# Patient Record
Sex: MALE | Race: ASIAN | NOT HISPANIC OR LATINO | URBAN - METROPOLITAN AREA
[De-identification: names, ages, dates, MRNs, and addresses within clinical notes are randomized per-mention and may not be internally consistent; named-entity substitution may affect disease eponyms.]

---

## 2019-08-16 ENCOUNTER — OFFICE VISIT - HEALTHEAST (OUTPATIENT)
Dept: FAMILY MEDICINE | Facility: CLINIC | Age: 29
End: 2019-08-16

## 2019-08-16 ENCOUNTER — COMMUNICATION - HEALTHEAST (OUTPATIENT)
Dept: TELEHEALTH | Facility: CLINIC | Age: 29
End: 2019-08-16

## 2019-08-16 DIAGNOSIS — Z20.6 EXPOSURE TO HUMAN IMMUNODEFICIENCY VIRUS: ICD-10-CM

## 2019-08-16 LAB
ANION GAP SERPL CALCULATED.3IONS-SCNC: 11 MMOL/L (ref 5–18)
BUN SERPL-MCNC: 15 MG/DL (ref 8–22)
CALCIUM SERPL-MCNC: 10 MG/DL (ref 8.5–10.5)
CHLORIDE BLD-SCNC: 104 MMOL/L (ref 98–107)
CO2 SERPL-SCNC: 26 MMOL/L (ref 22–31)
CREAT SERPL-MCNC: 0.98 MG/DL (ref 0.7–1.3)
GFR SERPL CREATININE-BSD FRML MDRD: >60 ML/MIN/1.73M2
GLUCOSE BLD-MCNC: 98 MG/DL (ref 70–125)
HIV 1+2 AB+HIV1 P24 AG SERPL QL IA: NEGATIVE
POTASSIUM BLD-SCNC: 4.3 MMOL/L (ref 3.5–5)
SODIUM SERPL-SCNC: 141 MMOL/L (ref 136–145)

## 2019-08-17 LAB
HBV SURFACE AG SERPL QL IA: NEGATIVE
T PALLIDUM AB SER QL: NEGATIVE

## 2019-08-19 LAB
C TRACH DNA SPEC QL PROBE+SIG AMP: NEGATIVE
N GONORRHOEA DNA SPEC QL NAA+PROBE: NEGATIVE

## 2019-09-06 ENCOUNTER — OFFICE VISIT - HEALTHEAST (OUTPATIENT)
Dept: FAMILY MEDICINE | Facility: CLINIC | Age: 29
End: 2019-09-06

## 2019-09-06 DIAGNOSIS — Z20.6 EXPOSURE TO HUMAN IMMUNODEFICIENCY VIRUS: ICD-10-CM

## 2019-09-06 DIAGNOSIS — Z00.00 ROUTINE HISTORY AND PHYSICAL EXAMINATION OF ADULT: ICD-10-CM

## 2019-09-06 DIAGNOSIS — Z11.3 SCREEN FOR STD (SEXUALLY TRANSMITTED DISEASE): ICD-10-CM

## 2019-09-06 LAB
CHOLEST SERPL-MCNC: 189 MG/DL
FASTING STATUS PATIENT QL REPORTED: YES
HDLC SERPL-MCNC: 44 MG/DL
LDLC SERPL CALC-MCNC: 116 MG/DL
TRIGL SERPL-MCNC: 144 MG/DL

## 2019-09-06 RX ORDER — EMTRICITABINE AND TENOFOVIR DISOPROXIL FUMARATE 200; 300 MG/1; MG/1
1 TABLET, FILM COATED ORAL DAILY
Qty: 30 TABLET | Refills: 0 | Status: SHIPPED | OUTPATIENT
Start: 2019-09-06

## 2019-09-09 ENCOUNTER — COMMUNICATION - HEALTHEAST (OUTPATIENT)
Dept: INTERNAL MEDICINE | Facility: CLINIC | Age: 29
End: 2019-09-09

## 2019-09-09 LAB — HEPATITIS B SURFACE ANTIBODY LHE- HISTORICAL: POSITIVE

## 2019-09-27 ENCOUNTER — OFFICE VISIT - HEALTHEAST (OUTPATIENT)
Dept: FAMILY MEDICINE | Facility: CLINIC | Age: 29
End: 2019-09-27

## 2019-09-27 DIAGNOSIS — B35.6 TINEA CRURIS: ICD-10-CM

## 2019-09-27 DIAGNOSIS — R21 GROIN RASH: ICD-10-CM

## 2019-09-28 ENCOUNTER — COMMUNICATION - HEALTHEAST (OUTPATIENT)
Dept: SCHEDULING | Facility: CLINIC | Age: 29
End: 2019-09-28

## 2019-09-28 LAB
HSV SPECIMEN: NORMAL
HSV1 DNA SPEC QL NAA+PROBE: NEGATIVE
HSV2 DNA SPEC QL NAA+PROBE: NEGATIVE

## 2019-09-30 ENCOUNTER — OFFICE VISIT - HEALTHEAST (OUTPATIENT)
Dept: FAMILY MEDICINE | Facility: CLINIC | Age: 29
End: 2019-09-30

## 2019-09-30 DIAGNOSIS — R21 GROIN RASH: ICD-10-CM

## 2021-05-31 NOTE — PROGRESS NOTES
Impression:  Possible HIV exposure, interested in starting on preexposure HIV prophylaxis    Plan:  Check HIV, RPR, GC, chlamydia, hepatitis B surface antigen, metabolic profile.  Defer to internal medicine to follow-up on results of testing and to start on preexposure prophylaxis      Chief Complaint:  Chief Complaint   Patient presents with     HIV Positive/AIDS     patient interested in HIV testing today- partner exposed to HIV          HPI:   Wero Pal is a 28 y.o. male who presents to this clinic for the evaluation of HIV testing.  The patient's male sexual partner was recently diagnosed with HIV and he requests testing.  The patient was last tested 7 years ago and was negative.  He has had no symptoms of fever chills body aches adenopathy rash.  He has not had sex with his partner for many months.  He is interested in starting on preexposure HIV prophylaxis for the future.      PMH:   No past medical history on file.  No past surgical history on file.      ROS:  All other systems negative    Meds:  No current outpatient medications on file.        Social:  Social History     Socioeconomic History     Marital status: Single     Spouse name: Not on file     Number of children: Not on file     Years of education: Not on file     Highest education level: Not on file   Occupational History     Not on file   Social Needs     Financial resource strain: Not on file     Food insecurity:     Worry: Not on file     Inability: Not on file     Transportation needs:     Medical: Not on file     Non-medical: Not on file   Tobacco Use     Smoking status: Never Smoker     Smokeless tobacco: Never Used   Substance and Sexual Activity     Alcohol use: Not on file     Drug use: Not on file     Sexual activity: Not on file   Lifestyle     Physical activity:     Days per week: Not on file     Minutes per session: Not on file     Stress: Not on file   Relationships     Social connections:     Talks on phone: Not on file     Gets  together: Not on file     Attends Congregation service: Not on file     Active member of club or organization: Not on file     Attends meetings of clubs or organizations: Not on file     Relationship status: Not on file     Intimate partner violence:     Fear of current or ex partner: Not on file     Emotionally abused: Not on file     Physically abused: Not on file     Forced sexual activity: Not on file   Other Topics Concern     Not on file   Social History Narrative     Not on file         Physical Exam:  Vital signs reviewed  Eyes: PERRL, EOMI  Head: Atraumatic and normocephalic  Skin: No lesions or rash  Neuro: Normal motor and sensory function in all extremities  Psych: Awake, alert, normally responsive      Results:    No results found for this or any previous visit (from the past 24 hour(s)).    No results found.      Dima Olivia MD

## 2021-06-01 NOTE — PROGRESS NOTES
MALE PREVENTATIVE EXAM    Assessment and Plan:       1. Screen for STD (sexually transmitted disease)  - Hepatitis B Surface Antibody (Anti-HBs)  Positive with immunity    2. Routine history and physical examination of adult  - Lipid Profile  Normal    3. Exposure to human immunodeficiency virus  Infected partner recently diagnosed with HIV whom he had relationship with a year ago.   - initial labs were negative as tested in the [facility] on 8/16/19.   - recommending Recheck HIV in a month, 3 months, and 6 months interval.     He expressed interest in taking Truvada.     Discussed confirming immunity to Hep B, and Anti-HBs tested negative     Plan:   - emtricitabine-tenofovir, TDF, (TRUVADA) 200-300 mg per tablet; Take 1 tablet by mouth daily.  Dispense: 30 tablet; Refill: 0     Next follow up:  Return in about 1 month (around 10/6/2019) for Follow up in Primary Care.  Recheck HIV in a month, 3 months, and 6 months interval.       Immunization Review  Adult Imm Review: No immunizations due today    I discussed the following with the patient:   Adult Healthy Living: Importance of regular exercise  Healthy nutrition, and safe sex practices.     An additional 25  minutes spent on this visit with more than 50% time spent on   reviewing medical record, education and discussion of the above problem, and commenting the result notes.         Subjective:   Chief Complaint: Wero Pal is an 28 y.o. male here for a preventative health visit.       He is also here for healthcare establishment and in follow up to HIV exposure. The patient's male sexual partner whom been having a relationship with a year ago, was recently diagnosed with HIV.   He was checked for  HIV, RPR, GC, chlamydia, hepatitis B surface antigen, metabolic profile on 8/16/19, with negative results and treatment deferred to primary care.   His Prior STD check ups have all been negative, last tested 7 years ago.  He has no other presenting symptoms, and inquiring  about  preexposure prophylaxis.       Healthy Habits  Are you taking a daily aspirin? No  Do you typically exercising at least 40 min, 3-4 times per week?  Yes  Do you usually eat at least 4 servings of fruit and vegetables a day, include whole grains and fiber and avoid regularly eating high fat foods? Yes  Have you had an eye exam in the past two years? NO  Do you see a dentist twice per year? Yes  Do you have any concerns regarding sleep? No    Safety Screen  If you own firearms, are they secured in a locked gun cabinet or with trigger locks? NO  No data recorded    Review of Systems  Allergy: reviewed  General : negative  Ophthalmic : negative  ENT : negative  Respiratory : no cough, shortness of breath, or wheezing  Cardiovascular : no chest pain or dyspnea on exertion  Gastrointestinal : no abdominal pain, change in bowel habits, or black or bloody stools  Genito-Urinary :  no dysuria, trouble voiding, or hematuria  Dermatological : negative    Musculoskeletal : negative  Neurological : negative  Hematological and Lymphatic : negative  Endocrine : negative          Cancer Screening     Patient has no health maintenance due at this time              History     Reviewed By Date/Time Sections Reviewed    Andrei Navarrete CMA 9/6/2019  7:59 AM Tobacco            Objective:   Vital Signs:   Visit Vitals  /60 (Patient Site: Right Arm, Patient Position: Sitting, Cuff Size: Adult Regular)   Pulse 66   Wt 167 lb 6.4 oz (75.9 kg)   SpO2 99%          PHYSICAL EXAM  General Appearance:    Alert, cooperative, no distress, appears stated age   Head:    Normocephalic, without obvious abnormality, atraumatic   Eyes:    PERRL, conjunctiva/corneas clear, EOM's intact, fundi     benign, both eyes        Ears:    Normal TM's and external ear canals, both ears   Nose:   Nares normal, septum midline, mucosa normal, no drainage    or sinus tenderness   Throat:   Lips, mucosa, and tongue normal;  gums normal   Neck:   Supple,  symmetrical, trachea midline, no adenopathy;        thyroid:  No enlargement/tenderness/nodules; no carotid    bruit or JVD   Back:     Symmetric, no curvature, ROM normal, no CVA tenderness   Lungs:     Clear to auscultation bilaterally, respirations unlabored   Chest wall:    No tenderness or deformity   Heart:    Regular rate and rhythm, S1 and S2 normal, no murmur, rub   or gallop   Abdomen:     Soft, non-tender, bowel sounds active all four quadrants,     no masses, no organomegaly   Genitalia:    no penile lesions or discharge, no testicular masses or tenderness, no hernias   Extremities:   Extremities normal, atraumatic, no cyanosis or edema   Pulses:   2+ and symmetric all extremities   Skin:   Skin color, texture, turgor normal, no rashes or lesions   Lymph nodes:   Cervical, supraclavicular, and axillary nodes normal   Neurologic:   CNII-XII intact. Normal strength, sensation and reflexes       throughout                 Additional Screenings Completed Today:

## 2021-06-01 NOTE — PATIENT INSTRUCTIONS - HE
1. Contineu with the cream previously given.   2. Begin Valtrex.   3. You will be notified of you HSV test results. This could also be shingles.   4. Follow up for further discussion of this rash with your primary care provider tomorrow.

## 2021-06-01 NOTE — PROGRESS NOTES
Impression:  Tinea cruris, rule out underlying HSV infection      Plan:  Check swab for viral culture.  Begin clotrimazole cream to the area.  Keep the area clean and dry.  Follow-up with primary care as needed      Chief Complaint:  Chief Complaint   Patient presents with     Sores     x5d, anal region, poss herpes         HPI:   Wero Pal is a 28 y.o. male who presents to this clinic for the evaluation of lesion in the right groin.  Patient complains of a one-week history of lesions in the right groin.  These are in the right inguinal fold have become irritated and painful and occasionally bleed.  He has had oral genital contact with males but no insertive penile intercourse.  Denies any dysuria or hematuria.  Denies any other skin lesions.  No abdominal pain      PMH:   No past medical history on file.  No past surgical history on file.      ROS:  All other systems negative    Meds:    Current Outpatient Medications:      clotrimazole (LOTRIMIN) 1 % cream, Apply topically 2 (two) times a day., Disp: 30 g, Rfl: 3     emtricitabine-tenofovir, TDF, (TRUVADA) 200-300 mg per tablet, Take 1 tablet by mouth daily., Disp: 30 tablet, Rfl: 0        Social:  Social History     Socioeconomic History     Marital status: Single     Spouse name: Not on file     Number of children: Not on file     Years of education: Not on file     Highest education level: Not on file   Occupational History     Not on file   Social Needs     Financial resource strain: Not on file     Food insecurity:     Worry: Not on file     Inability: Not on file     Transportation needs:     Medical: Not on file     Non-medical: Not on file   Tobacco Use     Smoking status: Never Smoker     Smokeless tobacco: Never Used   Substance and Sexual Activity     Alcohol use: Not on file     Drug use: Not on file     Sexual activity: Not on file   Lifestyle     Physical activity:     Days per week: Not on file     Minutes per session: Not on file     Stress:  Not on file   Relationships     Social connections:     Talks on phone: Not on file     Gets together: Not on file     Attends [employer] service: Not on file     Active member of club or organization: Not on file     Attends meetings of clubs or organizations: Not on file     Relationship status: Not on file     Intimate partner violence:     Fear of current or ex partner: Not on file     Emotionally abused: Not on file     Physically abused: Not on file     Forced sexual activity: Not on file   Other Topics Concern     Not on file   Social History Narrative     Not on file         Physical Exam:  Right groin shows superficial linear ulcerations in the right inguinal fold.  No vesicles.  No penile lesions.  Nothing on the left side.  Anal region appears normal.  Exam is otherwise unremarkable      Results:    No results found for this or any previous visit (from the past 24 hour(s)).    No results found.      Dima Olivia MD

## 2021-06-01 NOTE — PROGRESS NOTES
Chief Complaint   Patient presents with     Fungal infection     over 1wk, was seen for same symptoms a few days ago feels like it's getting worse       HPI:  Wero Pal is a 28 y.o. male who presents today complaining of rash lesions of the right groin for the past 1 week.  Patient was seen on 9/27/2019 for the same complaint at that time he was started on topical clotrimazole with the suspicion that this was tinea cruris.  A swab to rule out HSV was also collected during this visit it is still in process.  Patient states that the rash has been worsening.  He denies any fevers or chills.  He did not experience that much pain, there is is some itching.  Patient states that he has previously had shingles in the lower back.    History obtained from the patient.    Problem List:  There are no relevant problems documented for this patient.      No past medical history on file.    Social History     Tobacco Use     Smoking status: Never Smoker     Smokeless tobacco: Never Used   Substance Use Topics     Alcohol use: Not on file       Review of Systems   Constitutional: Negative for chills and fever.   Skin: Positive for rash.   All other systems reviewed and are negative.      Vitals:    09/30/19 0944   BP: 105/69   Patient Site: Right Arm   Patient Position: Sitting   Cuff Size: Adult Regular   Pulse: (!) 59   Resp: 16   Temp: 98  F (36.7  C)   TempSrc: Oral   SpO2: 98%   Weight: 164 lb (74.4 kg)       Physical Exam  Constitutional:       General: He is not in acute distress.     Appearance: He is well-developed. He is not diaphoretic.   HENT:      Head: Normocephalic and atraumatic.      Right Ear: External ear normal.      Left Ear: External ear normal.   Eyes:      General:         Right eye: No discharge.         Left eye: No discharge.      Conjunctiva/sclera: Conjunctivae normal.   Pulmonary:      Effort: Pulmonary effort is normal. No respiratory distress.   Skin:     Comments: Wet and raw appearing rash  present in the right groin region.  No signs of secondary bacterial infection such as cellulitis.  There are a few dot-like lesions surrounding the area.  The rash spans from the upper groin onto the right perineum   Psychiatric:         Behavior: Behavior normal.         Thought Content: Thought content normal.         Judgement: Judgment normal.         Clinical Decision Making:  Groin rash does appear consistent with tinea cruris.  HSV is still pending.  Differential diagnosis still does also include herpes zoster.  I will start this patient on Valtrex as he has not noted any significant improvement with clotrimazole alone.  Do not note any indications of a bacterial infection such as cellulitis.  Patient has a follow-up appointment with primary care tomorrow.  At the end of the encounter, I discussed results, diagnosis, medications. Discussed red flags for immediate return to clinic/ER, as well as indications for follow up if no improvement. Patient understood and agreed to plan. Patient was stable for discharge.    1. Groin rash  valACYclovir (VALTREX) 1000 MG tablet         Patient Instructions   1. Contineu with the cream previously given.   2. Begin Valtrex.   3. You will be notified of you HSV test results. This could also be shingles.   4. Follow up for further discussion of this rash with your primary care provider tomorrow.

## 2021-06-01 NOTE — TELEPHONE ENCOUNTER
[facility] lab call:    Re: lab order from [facility] provider yesterday    The specimen was from a right groin wound and the the order was placed a non-respiratory viral culture.  However, Lab staff states that groin and dermal specimens are not accepted for non respiratory viral culture.     Would provider like to change the lab order to:   PCR lab 917 order    Which would check for Herpes 1 & 2 DNA?    Please call Lara at [facility] lab to clarify.     Harriet Davenport RN, [facility] Refill/Triage, 9/28/2019 11:34 AM

## 2021-06-03 VITALS
OXYGEN SATURATION: 99 % | HEART RATE: 66 BPM | DIASTOLIC BLOOD PRESSURE: 60 MMHG | SYSTOLIC BLOOD PRESSURE: 106 MMHG | WEIGHT: 167.4 LBS

## 2021-06-03 VITALS
RESPIRATION RATE: 16 BRPM | TEMPERATURE: 98 F | OXYGEN SATURATION: 98 % | HEART RATE: 59 BPM | SYSTOLIC BLOOD PRESSURE: 105 MMHG | DIASTOLIC BLOOD PRESSURE: 69 MMHG | WEIGHT: 164 LBS

## 2021-06-03 VITALS
OXYGEN SATURATION: 99 % | TEMPERATURE: 98.3 F | HEART RATE: 71 BPM | DIASTOLIC BLOOD PRESSURE: 75 MMHG | SYSTOLIC BLOOD PRESSURE: 114 MMHG | WEIGHT: 167.25 LBS | RESPIRATION RATE: 16 BRPM

## 2021-06-03 VITALS — WEIGHT: 162 LBS

## 2021-06-17 NOTE — PATIENT INSTRUCTIONS - HE
Patient Instructions by Dima Olivia MD at 9/27/2019  7:30 AM     Author: Dima Olivia MD Service: -- Author Type: Physician    Filed: 9/27/2019  8:00 AM Encounter Date: 9/27/2019 Status: Signed    : Dima Olivia MD (Physician)         Patient Education     Jock Itch (Tinea Cruris, General)  Jock itch (tinea cruris) is a red, itchy rash in the groin caused by a fungal infection. It occurs in skin folds where it is warm and moist. It commonly starts as a small, red, itchy patch that grows larger. The patch is usually in the shape of a round ring, 1 to 2 inches wide. It may cause the skin to flake. It may also spread to the scrotum or the skin that covers your testicles. This infection is treated with skin creams or oral medicine.  Home care    If you were prescribed a cream, use it exactly as directed. You can buy some antifungal creams without a prescription.    It may take a week before the fungus starts to go away. It can take about 2 to 3 weeks to completely clear. To stop the rash from coming back, keep using the medicine until the rash is all gone.    Wash the area at least once a day with soap and water. Pat dry and apply medicine.     Wear loose-fitting underwear to let your skin breathe. Change your underwear daily.    Once the rash is gone, keep the area clean and dry to prevent reinfection. If recurrence is a problem, use a medicated antifungal powder daily. This is available over the counter.  Prevention  The following tips may help prevent jock itch:    Don't share clothes, towels, or sports gear with others unless they have been washed.    Change your underwear daily.    Keep skin clean and dry, especially after showering or swimming.    Lose weight.    Do not wear tight underwear.    Treat athlete's foot if it occurs.  Follow-up care  Follow up with your healthcare provider, or as advised. Call your provider if the rash is not starting to improve after 10 days of treatment, or  if the rash continues to spread.  When to seek medical advice  Call your healthcare provider right away if any of these occur:    Increasing pain in the rash area    Redness that spreads around the rash    Fluid draining from the rash    Rash returns soon after treatment    Fever of 100.4 F (38 C) or higher, or as directed by your provider  Date Last Reviewed: 8/1/2016 2000-2017 [employer]. [address] [place] [address]. All rights reserved. This information is not intended as a substitute for professional medical care. Always follow your healthcare professional's instructions.

## 2021-06-17 NOTE — PATIENT INSTRUCTIONS - HE
Patient Instructions by Dima Olivia MD at 8/16/2019  1:00 PM     Author: Dima Olivia MD Service: -- Author Type: Physician    Filed: 8/16/2019  1:35 PM Encounter Date: 8/16/2019 Status: Signed    : Dima Olivia MD (Physician)         Patient Education     What Are Sexually Transmitted Diseases (STDs)?  A sexually transmitted disease (STD) is a disease that is spread during sex. (An STD can also be called STI for sexually transmitted infection.) You can become infected with an STD if you have sex with someone who has an STD. Any sex that involves the penis, vagina, anus, or mouth can spread disease. Some STDs spread through body fluids such as semen, vaginal fluid, or blood. Others spread through contact with affected skin.  Who is at risk?     Places on or in the body where STDs cause damage include reproductive organs, the rectum, and the mouth.   It doesnt matter if youre straight or ochoa, male or female, young or old. Any person who has sex can get an STD. Your risk increases if:    You have more than one partner. The more partners you have, the greater your risk.    Your partner has other partners. If your partner is exposed to an STD, you could be, too.    You or your partner have had sex with other people in the past. Either of you might be carrying an STD from an earlier partner.    You have an STD. The STD may cause sores or other health problems that increase your risk of new infections. Your risk will stay high unless you change the behaviors that put you at risk of the current infection.  Prevent future problems  Left untreated, certain STDs can lead to cancer or even death. Some can harm unborn babies whose mothers are infected. Others can cause you to not be able to have children (sterility) or can affect changes in behavior or your ability to think. You can prevent these problems with safer sex, regular checkups, and early treatment. Always use a latex condom when you have sex.  Get tested if youre at risk. And get treated early if you have an STD.  Getting checked  The only sure way to know if you have an STD is to get checked by a healthcare provider. If you notice a change in how your body looks or feels, have it checked out. But keep in mind, STDs dont always show symptoms. So if youre at risk of STDs, get checked regularly. If you find you have an STD, be sure your partner gets treatment, too. If not, his or her health is at risk. And left untreated, your partner could pass the STD back to you, or on to others.  Common symptoms  Be alert to any changes in your body and your partners body. Symptoms may appear in or near the vagina, penis, rectum, mouth, or throat. They include:    Unusual discharge    Lumps, bumps, or rashes    Sores that may be painful, itchy, or painless    Itchy skin    Burning with urination    Pain in the pelvis, belly (abdomen), or rectum  Even if you dont have symptoms  You may have an STD, even if you dont have symptoms. If you think you are at risk, get checked. Go to a clinic or to your healthcare provider. If your partner has an STD, you need to be tested too, even if you feel fine.  Vaccines to prevent disease  Vaccines (also called immunizations) are available to prevent hepatitis A and hepatitis B. These are two kinds of STDs. There is also a vaccine to prevent HPV. This is a virus that can be passed from person to person through sexual contact. Ask your healthcare provider whether any of these vaccines is right for you.   Date Last Reviewed: 11/1/2016 2000-2017 [employer]. [address] [place] [address], [place] [address]. All rights reserved. This information is not intended as a substitute for professional medical care. Always follow your healthcare professional's instructions.

## 2021-06-19 NOTE — LETTER
Letter by Beto Newsome MD at      Author: Beto Newsome MD Service: -- Author Type: --    Filed:  Encounter Date: 9/9/2019 Status: (Other)         Wero Pal  [address]  [place] [address]             September 9, 2019         Dear Mr. Pal,    Below are the results from your recent visit:    Resulted Orders   Hepatitis B Surface Antibody (Anti-HBs)   Result Value Ref Range    Hep B Surface Antibody Positive (!) Negative   Lipid Profile   Result Value Ref Range    Triglycerides 144 <=149 mg/dL    Cholesterol 189 <=199 mg/dL    LDL Calculated 116 <=129 mg/dL    HDL Cholesterol 44 >=40 mg/dL    Patient Fasting > 8hrs? Yes              Positive Hep B immunity     Labs normal         Please call with questions or contact us using [employer].    Sincerely,        Electronically signed by Beto Newsome MD

## 2021-06-26 ENCOUNTER — OFFICE VISIT - HEALTHEAST (OUTPATIENT)
Dept: FAMILY MEDICINE | Facility: CLINIC | Age: 31
End: 2021-06-26

## 2021-06-26 DIAGNOSIS — J30.2 SEASONAL ALLERGIC RHINITIS, UNSPECIFIED TRIGGER: ICD-10-CM

## 2021-06-26 RX ORDER — EMTRICITABINE AND TENOFOVIR ALAFENAMIDE 200; 25 MG/1; MG/1
1 TABLET ORAL
Status: SHIPPED | COMMUNITY
Start: 2021-04-21

## 2021-06-26 RX ORDER — OLOPATADINE HYDROCHLORIDE 2 MG/ML
1 SOLUTION/ DROPS OPHTHALMIC
Status: SHIPPED | COMMUNITY
Start: 2021-05-05

## 2021-06-26 RX ORDER — AZELASTINE 1 MG/ML
2 SPRAY, METERED NASAL
Status: SHIPPED | COMMUNITY
Start: 2021-05-05

## 2021-06-27 ENCOUNTER — HEALTH MAINTENANCE LETTER (OUTPATIENT)
Age: 31
End: 2021-06-27

## 2021-07-03 NOTE — ADDENDUM NOTE
Addendum Note by Mina Rudd DO at 9/27/2019  7:30 AM     Author: Mina Rudd DO Service: -- Author Type: Physician    Filed: 9/28/2019 12:42 PM Encounter Date: 9/27/2019 Status: Signed    : Mina Rudd DO (Physician)    Addended by: MINA RUDD on: 9/28/2019 12:42 PM        Modules accepted: Orders

## 2021-07-06 VITALS
TEMPERATURE: 98.5 F | WEIGHT: 164.13 LBS | HEART RATE: 77 BPM | OXYGEN SATURATION: 97 % | SYSTOLIC BLOOD PRESSURE: 119 MMHG | DIASTOLIC BLOOD PRESSURE: 77 MMHG

## 2021-07-07 NOTE — PATIENT INSTRUCTIONS - HE
Extra allegra at night if you want     Continue nasal spray     Dust mites? Try switching out your pillow.      Can try a Neti Pot before bed.      He recommended switching to Zyrtec (cetrizine) - 10 mg daily and adding Flonase (fluticasone) 2 sprays in each nostril nightly.

## 2021-07-07 NOTE — PROGRESS NOTES
"Chief Complaint   Patient presents with     Cough     X1week, relief with allergy meds     Nasal Congestion     On and off X1week, relief with allergy meds       ASSESSMENT & PLAN:   Diagnoses and all orders for this visit:    Seasonal allergic rhinitis, unspecified trigger        Reviewed [facility] allergist note from 5/21 for similar sx.  They recommended adding Flonase and Zyrtec.  Reiterated these recommendations to patient.  Explained he can use Flonase semitemporarily if he does usually do fine with Allegra and Astelin alone. Discussed cat/dog dander and dust mite reduction.     Do not believe this is infectious as sx worse at night and better after allergy meds.     Supportive care discussed.  See discharge instructions below for specific recommendations given.    At the end of the encounter, I discussed results, diagnosis, medications with the patient and/or caregivers. Discussed red flags for immediate return to clinic/ER, as well as indications for follow up if no improvement. Patient and/or caregiver understood and agreed to plan. Patient was stable for discharge.    15 minutes spent on the date of the encounter doing chart review, review of outside records, patient visit and documentation       SUBJECTIVE    HPI:  MÓNICA Pal presents to the walk-in clinic with   Chief Complaint   Patient presents with     Cough     X1week, relief with allergy meds     Nasal Congestion     On and off X1week, relief with allergy meds     Cough with PND, worse in am.  Runny nose.  Bad again with bed.     Feels better with allergy medicine.      Taking allergra and astelin - using x 1 month     Allergic to \"everything.\" Had allergy shots as a kid.      Had covid vaccine and had covid 6 months ago.     Associated with: Will still cough throughout the day.        Denies: fever chills      See ROS for additional symptoms and/or pertinent negatives.       History obtained from the patient.      Review of Systems "   Constitutional: Negative for chills and fever.   HENT: Positive for congestion, postnasal drip and rhinorrhea. Negative for ear pain, sinus pressure, sinus pain and sore throat.    Respiratory: Positive for cough.    Musculoskeletal: Negative for arthralgias and myalgias.       OBJECTIVE    Vitals:    06/26/21 0937   BP: 119/77   Patient Site: Right Arm   Patient Position: Sitting   Cuff Size: Adult Regular   Pulse: 77   Temp: 98.5  F (36.9  C)   TempSrc: Oral   SpO2: 97%   Weight: 164 lb 2 oz (74.4 kg)       Physical Exam  Constitutional:       General: He is not in acute distress.     Appearance: He is well-developed.   HENT:      Right Ear: External ear normal.      Left Ear: External ear normal.      Nose: Congestion and rhinorrhea present.      Mouth/Throat:      Pharynx: Posterior oropharyngeal erythema present. No oropharyngeal exudate.   Eyes:      General:         Right eye: No discharge.         Left eye: No discharge.      Conjunctiva/sclera: Conjunctivae normal.   Pulmonary:      Effort: Pulmonary effort is normal.   Musculoskeletal: Normal range of motion.   Skin:     General: Skin is warm and dry.      Capillary Refill: Capillary refill takes less than 2 seconds.   Neurological:      Mental Status: He is alert and oriented to person, place, and time.   Psychiatric:         Mood and Affect: Mood normal.         Behavior: Behavior normal.         Thought Content: Thought content normal.         Judgment: Judgment normal.         Labs/EKG:          Radiology:        PATIENT INSTRUCTIONS:   Patient Instructions   Extra allegra at night if you want     Continue nasal spray     Dust mites? Try switching out your pillow.      Can try a Neti Pot before bed.      He recommended switching to Zyrtec (cetrizine) - 10 mg daily and adding Flonase (fluticasone) 2 sprays in each nostril nightly.

## 2021-10-16 ENCOUNTER — HEALTH MAINTENANCE LETTER (OUTPATIENT)
Age: 31
End: 2021-10-16

## 2022-07-23 ENCOUNTER — HEALTH MAINTENANCE LETTER (OUTPATIENT)
Age: 32
End: 2022-07-23

## 2022-10-01 ENCOUNTER — HEALTH MAINTENANCE LETTER (OUTPATIENT)
Age: 32
End: 2022-10-01

## 2023-08-06 ENCOUNTER — HEALTH MAINTENANCE LETTER (OUTPATIENT)
Age: 33
End: 2023-08-06